# Patient Record
(demographics unavailable — no encounter records)

---

## 2025-02-04 NOTE — PHYSICAL EXAM
[Abdomen Masses] : No abdominal masses [Abdomen Tenderness] : ~T No ~M abdominal tenderness [No HSM] : no hepatosplenomegaly [No Rash or Lesion] : No rash or lesion [Alert] : alert [Oriented to Person] : oriented to person [Oriented to Place] : oriented to place [Oriented to Time] : oriented to time [Calm] : calm [de-identified] : Soft [de-identified] : Normal [de-identified] : External - large posterior anal fissure w/ anal spasm; SHARRON and anoscopy deferred [de-identified] : Normal [de-identified] : Normal [de-identified] : Normal [de-identified] : Normal

## 2025-02-04 NOTE — PHYSICAL EXAM
[Abdomen Masses] : No abdominal masses [Abdomen Tenderness] : ~T No ~M abdominal tenderness [No HSM] : no hepatosplenomegaly [No Rash or Lesion] : No rash or lesion [Alert] : alert [Oriented to Person] : oriented to person [Oriented to Place] : oriented to place [Oriented to Time] : oriented to time [Calm] : calm [de-identified] : Soft [de-identified] : External - large posterior anal fissure w/ anal spasm; SHARRON and anoscopy deferred [de-identified] : Normal [de-identified] : Normal [de-identified] : Normal [de-identified] : Normal [de-identified] : Normal

## 2025-02-04 NOTE — ASSESSMENT
[FreeTextEntry1] : 63 M w/ painful large anal fissure and anal spasm.  Plan: I recommended botox injection. He requested a trial of NTG ointment at a stronger concentration. I discussed side effects of headaches and light-headedness. He understands and accepts this.  Trial NTG cream 0.6% for 2 weeks. If no improvement, would recommend anoscopy, botox injection. High fiber diet. Sitz baths. Stool softeners as needed. All questions answered.

## 2025-02-04 NOTE — HISTORY OF PRESENT ILLNESS
[FreeTextEntry1] : 63-year-old male pt referred by Dr. Stone for anal fissure, Pt failed diltiazem and was recently placed on NTG for 2 weeks  Flexible sigmoidoscopy 1/13/25 for rectal pain and diverticulosis Impression: Sigmoid diverticula seen Normal appearing rectum Prominent anal papilla seen No active fissure seen No external fistulous opening or erythema, dermatitis or abnormalities seen No tenderness on palpation or SHARRON  Pt reports that the NTG hasn't helped either Had an anal fissure in 2003 an NTG worked then but not now. Denies any headaches Pain with all the time worse with BM and lingers long after the BM all day Has had this since August Occasional spotting of blood but rare Moving his bowels regularly
